# Patient Record
Sex: MALE | Race: WHITE | URBAN - METROPOLITAN AREA
[De-identification: names, ages, dates, MRNs, and addresses within clinical notes are randomized per-mention and may not be internally consistent; named-entity substitution may affect disease eponyms.]

---

## 2017-05-12 ENCOUNTER — MEDICAL CORRESPONDENCE (OUTPATIENT)
Dept: HEALTH INFORMATION MANAGEMENT | Facility: CLINIC | Age: 37
End: 2017-05-12

## 2017-05-24 ENCOUNTER — TRANSFERRED RECORDS (OUTPATIENT)
Dept: HEALTH INFORMATION MANAGEMENT | Facility: CLINIC | Age: 37
End: 2017-05-24

## 2017-06-27 DIAGNOSIS — H53.10 SUBJECTIVE VISUAL DISTURBANCE: Primary | ICD-10-CM

## 2017-06-28 ENCOUNTER — OFFICE VISIT (OUTPATIENT)
Dept: OPHTHALMOLOGY | Facility: CLINIC | Age: 37
End: 2017-06-28
Attending: OPHTHALMOLOGY
Payer: COMMERCIAL

## 2017-06-28 DIAGNOSIS — H46.9 OPTIC NEUROPATHY: ICD-10-CM

## 2017-06-28 DIAGNOSIS — H53.10 SUBJECTIVE VISUAL DISTURBANCE: ICD-10-CM

## 2017-06-28 DIAGNOSIS — H50.012 MONOCULAR ESOTROPIA OF LEFT EYE: Primary | ICD-10-CM

## 2017-06-28 PROBLEM — Z72.0 TOBACCO USER: Status: ACTIVE | Noted: 2017-06-28

## 2017-06-28 PROBLEM — E78.5 DYSLIPIDEMIA: Status: ACTIVE | Noted: 2017-06-28

## 2017-06-28 PROBLEM — F79 INTELLECTUAL FUNCTIONING DISABILITY: Status: ACTIVE | Noted: 2017-06-28

## 2017-06-28 PROBLEM — I10 HYPERTENSION: Status: ACTIVE | Noted: 2017-06-28

## 2017-06-28 PROBLEM — N28.9 RENAL INSUFFICIENCY: Status: ACTIVE | Noted: 2017-05-17

## 2017-06-28 PROCEDURE — 92015 DETERMINE REFRACTIVE STATE: CPT | Mod: ZF

## 2017-06-28 PROCEDURE — 99215 OFFICE O/P EST HI 40 MIN: CPT | Mod: 25,ZF

## 2017-06-28 PROCEDURE — 92083 EXTENDED VISUAL FIELD XM: CPT | Mod: ZF | Performed by: OPHTHALMOLOGY

## 2017-06-28 PROCEDURE — 92060 SENSORIMOTOR EXAMINATION: CPT | Mod: ZF | Performed by: OPHTHALMOLOGY

## 2017-06-28 RX ORDER — ASPIRIN 81 MG/1
TABLET ORAL
COMMUNITY
Start: 2017-05-17 | End: 2018-05-17

## 2017-06-28 RX ORDER — METOPROLOL SUCCINATE 25 MG/1
TABLET, EXTENDED RELEASE ORAL
COMMUNITY
Start: 2017-05-17 | End: 2018-05-17

## 2017-06-28 RX ORDER — LISINOPRIL AND HYDROCHLOROTHIAZIDE 20; 25 MG/1; MG/1
TABLET ORAL
COMMUNITY
Start: 2017-05-17

## 2017-06-28 RX ORDER — ACETAMINOPHEN 500 MG
TABLET ORAL
COMMUNITY
Start: 2017-05-17

## 2017-06-28 RX ORDER — EPINEPHRINE 0.3 MG/.3ML
INJECTION SUBCUTANEOUS
COMMUNITY
Start: 2017-03-15

## 2017-06-28 ASSESSMENT — CONF VISUAL FIELD
OD_NORMAL: 1
OS_NORMAL: 1

## 2017-06-28 ASSESSMENT — REFRACTION_MANIFEST
OS_AXIS: 061
OD_CYLINDER: +0.50
OS_SPHERE: +4.50
OS_CYLINDER: +2.50
OD_AXIS: 100
OD_SPHERE: +3.25

## 2017-06-28 ASSESSMENT — TONOMETRY
IOP_METHOD: TONOPEN
OS_IOP_MMHG: 21
OD_IOP_MMHG: 21

## 2017-06-28 ASSESSMENT — VISUAL ACUITY
METHOD: SNELLEN - LINEAR
OD_SC: 20/100
METHOD_MR_RETINOSCOPY: 1
OS_SC: 20/125
OD_PH_SC: 20/80

## 2017-06-28 ASSESSMENT — SLIT LAMP EXAM - LIDS
COMMENTS: NORMAL
COMMENTS: NORMAL

## 2017-06-28 ASSESSMENT — CUP TO DISC RATIO
OD_RATIO: 0.7
OS_RATIO: 0.7

## 2017-06-28 ASSESSMENT — EXTERNAL EXAM - RIGHT EYE: OD_EXAM: NORMAL

## 2017-06-28 ASSESSMENT — EXTERNAL EXAM - LEFT EYE: OS_EXAM: NORMAL

## 2017-06-28 NOTE — NURSING NOTE
Sent here at request of Dr. Leo Oh MD from [facility] in [facility]. Pt told he has Optic Atrophy. BE feeling good and comfortable. Vision is blurry, ongoing for 2 years. LAVERNE ANAYA, COA 12:41 PM 06/28/2017

## 2017-06-28 NOTE — PROGRESS NOTES
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 Congenital esotropia     Christopher Harmon is a pleasant 36 year old White male who presents to my neuro-ophthalmology clinic today referred from Dr. Oh for evaluation of optic atrophy. He has a history of amblyopia/strabismus and recalls patching one eye. He reports that his vision has progressively decreasing in both eyes in the left eye more than in the right eye over the past few years. He feels that reading has been difficult over the past a few years specially in dark conditions. Upon further questioning he states that throughout childhood he has had difficulty seeing also and was never correctable to 20/20 that he can recall.  He used to have trouble see the board from the back of the room growing up.  This gradually worsened over time. Past medical history of hypertension and mild renal insufficiency. Smokes 1 PACK PER DAY for 25 years. Social drinking less than once a week. Regular diet. No exposure to chemicals. No family history of serious ocular disease or vision problems or \"color blindness.\"    Today the visual acuity is 20/80 in the right eye and 20/125 in the left eye. No afferent pupillary defect. Color vision was abnormal bilaterally. Extraocular motility was full. Cover testing showed comitant, manifest left esotropia 16 prism diopter with suppression of the left eye under binocular conditions. Slit lamp examination was unremarkable. Fundus exam showed bilateral diffuse optic atrophy with temporal predominant excavation, otherwise normal macula, vessels and periphery in both eyes.  No retinal pigment epithelium bone spicule type changes in the retinal periphery. Cranial nerves were normal bilaterally.    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    In " "conclusion, Mr. Harmon presents today for evaluation of unexplained decreasing vision in both eyes. Examination today is notable for bilateral optic atrophy with temporal predominate exacavation, the differential diagnosis includes compressive, nutritional/toxic, and hereditary \"Dominant optic atrophy\".  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  Regardless of the ultimate etiology I informed the patient that he should stop smoking as it can worsen optic neuropathy (and likely plays a significant role if dominant optic atrophy is the final diagnosis).  We will obtain a brain and orbital MRI with and without contrast along with CBC, CMP, Vitamin B12/folate, RPR, Anti treponema, and OPA1 sequencing (in an attempt to definitely prove the diagnosis of dominant optic atrophy).    Follow-up in my clinic will depend on test results.  May refer for full field electroretinogram if first round of testing negative.     I spent a total of 60 minutes face to face with Christopher Harmon during today's office visit.  Over 50% of this time was spent counseling the patient and/or coordinating care regarding bilateral optic atrophy and vision loss of unclear etiology.    Complete documentation of historical and exam elements from today's encounter can be found in the full encounter summary report (not reduplicated in this progress note).  I personally obtained the chief complaint(s) and history of present illness.  I confirmed and edited as necessary the review of systems, past medical/surgical history, family history, social history, and examination findings as documented by others; and I examined the patient myself.  I personally reviewed the relevant tests, images, and reports as documented above.  I formulated and edited as necessary the assessment and plan and discussed the findings and management plan with the patient and family   Gary Capellan, " MD

## 2017-06-28 NOTE — LETTER
"2017    RE: Christopher Harmon  : 1980  MRN: [id number]    Dear Dr. Oh,    Thank you for referring your patient, Christopher Harmon, to my neuro-ophthalmology clinic recently.  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 Congenital esotropia     Christopher Harmon is a pleasant 36 year old White male who presents to my neuro-ophthalmology clinic today referred from Dr. Oh for evaluation of optic atrophy. He has a history of amblyopia/strabismus and recalls patching one eye. He reports that his vision has progressively decreasing in both eyes in the left eye more than in the right eye over the past few years. He feels that reading has been difficult over the past a few years specially in dark conditions. Upon further questioning he states that throughout childhood he has had difficulty seeing also and was never correctable to 20/20 that he can recall.  He used to have trouble see the board from the back of the room growing up.  This gradually worsened over time. Past medical history of hypertension and mild renal insufficiency. Smokes 1 PACK PER DAY for 25 years. Social drinking less than once a week. Regular diet. No exposure to chemicals. No family history of serious ocular disease or vision problems or \"color blindness.\"    Today the visual acuity is 20/80 in the right eye and 20/125 in the left eye. No afferent pupillary defect. Color vision was abnormal bilaterally. Extraocular motility was full. Cover testing showed comitant, manifest left esotropia 16 prism diopter with suppression of the left eye under binocular conditions. Slit lamp examination was unremarkable. Fundus exam showed bilateral diffuse optic atrophy with temporal predominant excavation, otherwise normal macula, vessels and periphery in both " "eyes.  No retinal pigment epithelium bone spicule type changes in the retinal periphery. Cranial nerves were normal bilaterally.    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    In conclusion, Mr. Harmon presents today for evaluation of unexplained decreasing vision in both eyes. Examination today is notable for bilateral optic atrophy with temporal predominate exacavation, the differential diagnosis includes compressive, nutritional/toxic, and hereditary \"Dominant optic atrophy\".  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  Regardless of the ultimate etiology I informed the patient that he should stop smoking as it can worsen optic neuropathy (and likely plays a significant role if dominant optic atrophy is the final diagnosis).  We will obtain a brain and orbital MRI with and without contrast along with CBC, CMP, Vitamin B12/folate, RPR, Anti treponema, and OPA1 sequencing (in an attempt to definitely prove the diagnosis of dominant optic atrophy).    Follow-up in my clinic will depend on test results.  May refer for full field electroretinogram if first round of testing negative.    Again, thank you for trusting me with the care of your patient.  For further exam details, please feel free to contact our office for additional records.  If you wish to contact me regarding this patient please email me at [email or web address] or give my clinic a call to arrange a phone conversation.    Sincerely,    Gary Capellan MD  , Neuro-Ophthalmology and Adult Strabismus  Department of Ophthalmology and Visual Neurosciences  [facility]    DX: bilateral optic atrophy, possible dominant optic atrophy       "

## 2017-06-28 NOTE — NURSING NOTE
Chief Complaints and History of Present Illnesses   Patient presents with     Consult For     Optic Atrophy      HPI    Additional Referring Providers:  Dr. Leo Oh MD [facility]   Affected eye(s):  Both   Symptoms:     Blurred vision (Comment: Vision is blurry, hard to see )   Decreased vision (Comment: Notes that he has to close LE to focus at near )   No double vision      Unknown duration    Frequency:  Constant          Comments:  Christopher is a 36 year old male presenting with a history of:    1. Bilateral optic atrophy  2. History of amblyopia- history of patching. Unsure which eye.   3. Strabismus     Brain MRI: none.     Referred by Dr. Oh ([place], [place]).

## 2017-06-28 NOTE — MR AVS SNAPSHOT
After Visit Summary   6/28/2017    Christopher Harmon    MRN: [id number]           Patient Information     Date Of Birth          1980        Visit Information        Provider Department      6/28/2017 12:30 PM Gary Capellan MD [facility]        Today's Diagnoses     Monocular esotropia of left eye    -  1    Subjective visual disturbance        Optic neuropathy           Follow-ups after your visit        Your next 10 appointments already scheduled     Jul 10, 2017  4:45 PM CDT   (Arrive by 4:30 PM)   MR BRAIN W/O & W CONTRAST with [address] MRI ([facility])    [address] [address] [phone]           Take your medicines as usual, unless your doctor tells you not to. Bring a list of your current medicines to your exam (including vitamins, minerals and over-the-counter drugs).  You will be given intravenous contrast for this exam. To prepare:   The day before your exam, drink extra fluids at least six 8-ounce glasses (unless your doctor tells you to restrict your fluids).   Have a blood test (creatinine test) within 30 days of your exam. Go to your clinic or Diagnostic Imaging Department for this test.  The MRI machine uses a strong magnet. Please wear clothes without metal (snaps, zippers). A sweatsuit works well, or we may give you a hospital gown.  Please remove any body piercings and hair extensions before you arrive. You will also remove watches, jewelry, hairpins, wallets, dentures, partial dental plates and hearing aids. You may wear contact lenses, and you may be able to wear your rings. We have a safe place to keep your personal items, but it is safer to leave them at home.   **IMPORTANT** THE INSTRUCTIONS BELOW ARE ONLY FOR THOSE PATIENTS WHO HAVE BEEN TOLD THEY WILL RECEIVE SEDATION OR GENERAL ANESTHESIA DURING THEIR MRI PROCEDURE:  IF YOU WILL RECEIVE SEDATION (take medicine to help you  relax during your exam):   You must get the medicine from your doctor before you arrive. Bring the medicine to the exam. Do not take it at home.   Arrive one hour early. Bring someone who can take you home after the test. Your medicine will make you sleepy. After the exam, you may not drive, take a bus or take a taxi by yourself.   No eating 8 hours before your exam. You may have clear liquids up until 4 hours before your exam. (Clear liquids include water, clear tea, black coffee and fruit juice without pulp.)  IF YOU WILL RECEIVE ANESTHESIA (be asleep for your exam):   Arrive 1 1/2 hours early. Bring someone who can take you home after the test. You may not drive, take a bus or take a taxi by yourself.   No eating 8 hours before your exam. You may have clear liquids up until 4 hours before your exam. (Clear liquids include water, clear tea, black coffee and fruit juice without pulp.)  Please call the Imaging Department at your exam site with any questions.            Jul 10, 2017  5:30 PM CDT   (Arrive by 5:15 PM)   MR ORBIT FACE NECK W/O & W CONTRAST with [address] [facility] ([facility])    [address] [address] [phone]           Take your medicines as usual, unless your doctor tells you not to. Bring a list of your current medicines to your exam (including vitamins, minerals and over-the-counter drugs).  You will be given intravenous contrast for this exam. To prepare:   The day before your exam, drink extra fluids at least six 8-ounce glasses (unless your doctor tells you to restrict your fluids).   Have a blood test (creatinine test) within 30 days of your exam. Go to your clinic or Diagnostic Imaging Department for this test.  The MRI machine uses a strong magnet. Please wear clothes without metal (snaps, zippers). A sweatsuit works well, or we may give you a hospital gown.  Please remove any body piercings and hair  extensions before you arrive. You will also remove watches, jewelry, hairpins, wallets, dentures, partial dental plates and hearing aids. You may wear contact lenses, and you may be able to wear your rings. We have a safe place to keep your personal items, but it is safer to leave them at home.   **IMPORTANT** THE INSTRUCTIONS BELOW ARE ONLY FOR THOSE PATIENTS WHO HAVE BEEN TOLD THEY WILL RECEIVE SEDATION OR GENERAL ANESTHESIA DURING THEIR MRI PROCEDURE:  IF YOU WILL RECEIVE SEDATION (take medicine to help you relax during your exam):   You must get the medicine from your doctor before you arrive. Bring the medicine to the exam. Do not take it at home.   Arrive one hour early. Bring someone who can take you home after the test. Your medicine will make you sleepy. After the exam, you may not drive, take a bus or take a taxi by yourself.   No eating 8 hours before your exam. You may have clear liquids up until 4 hours before your exam. (Clear liquids include water, clear tea, black coffee and fruit juice without pulp.)  IF YOU WILL RECEIVE ANESTHESIA (be asleep for your exam):   Arrive 1 1/2 hours early. Bring someone who can take you home after the test. You may not drive, take a bus or take a taxi by yourself.   No eating 8 hours before your exam. You may have clear liquids up until 4 hours before your exam. (Clear liquids include water, clear tea, black coffee and fruit juice without pulp.)  Please call the Imaging Department at your exam site with any questions.              Future tests that were ordered for you today     Open Future Orders        Priority Expected Expires Ordered    MR Orbit Face Neck w/o & w Contrast Routine  6/28/2018 6/28/2017    MRI Brain w & w/o contrast Routine  6/28/2018 6/28/2017    Comprehensive metabolic panel Routine  9/26/2017 6/28/2017    CBC with platelets Routine  9/26/2017 6/28/2017    Autosomal Dominant Optic Atrophy, Kjer type, [id number]: Laboratory Miscellaneous Order Routine   2017    Folate RBC Routine  2017    Hematocrit Routine  2017    RPR screen with reflex to confirm Routine  2017    Anti Treponema Routine  2017    Vitamin B12 Routine  2017            Who to contact     Please call your clinic at [phone] to:    Ask questions about your health    Make or cancel appointments    Discuss your medicines    Learn about your test results    Speak to your doctor   If you have compliments or concerns about an experience at your clinic, or if you wish to file a complaint, please contact [facility] Patient Relations at [phone] or email us at [email or web address]         Additional Information About Your Visit        [employer] Information     [employer] is an electronic gateway that provides easy, online access to your medical records. With [employer], you can request a clinic appointment, read your test results, renew a prescription or communicate with your care team.     To sign up for [employer] visit the website at [email or web address]   You will be asked to enter the access code listed below, as well as some personal information. Please follow the directions to create your username and password.     Your access code is: [email or web address]  Expires: 2017  6:30 AM     Your access code will  in 90 days. If you need help or a new code, please contact your [facility] or call [phone] for assistance.        Care EveryWhere ID     This is your Care EveryWhere ID. This could be used by other organizations to access your [place] medical records  [id number]         Blood Pressure from Last 3 Encounters:   No data found for BP    Weight from Last 3 Encounters:   No data found for Wt              We Performed the Following     Glaucoma Top OU     IOP Measurement     OCT Optic Nerve RNFL Spectralis OU (both eyes)     Refraction      Sensorimotor        Primary Care Provider    Physician No Ref-Primary       No address on file        Equal Access to Services     REDGANGA ADRIANA : Hadii aad lucía cindy Hernandez, allyson romeliaani, junior cardona chasemilady, maura brisain hayaazafar stonejyoti hiltonanna marie bailonashazafar rosario. So [facility] [phone].    ATENCIÓN: Si habla español, tiene a wolf disposición servicios gratuitos de asistencia lingüística. [facility] [phone].    We comply with applicable federal civil rights laws and [place] laws. We do not discriminate on the basis of race, color, national origin, age, disability sex, sexual orientation or gender identity.            Thank you!     Thank you for choosing EYE CLINIC  for your care. Our goal is always to provide you with excellent care. Hearing back from our patients is one way we can continue to improve our services. Please take a few minutes to complete the written survey that you may receive in the mail after your visit with us. Thank you!             Your Updated Medication List - Protect others around you: Learn how to safely use, store and throw away your medicines at www.disposemymeds.org.          This list is accurate as of: 6/28/17  3:00 PM.  Always use your most recent med list.                   Brand Name Dispense Instructions for use Diagnosis    acetaminophen 500 MG tablet    TYLENOL          aspirin EC 81 MG EC tablet           ASPIRIN PO      81 mg po qd        EPINEPHrine 0.3 MG/0.3ML injection           LISINOPRIL PO           lisinopril-hydrochlorothiazide 20-25 MG per tablet    PRINZIDE/ZESTORETIC          metoprolol 25 MG 24 hr tablet    TOPROL-XL          OMEPRAZOLE PO           UNKNOWN TO PATIENT      Not sure of name of other med for HTN

## 2017-07-10 ENCOUNTER — HOSPITAL ENCOUNTER (OUTPATIENT)
Facility: CLINIC | Age: 37
Setting detail: SPECIMEN
Discharge: HOME | End: 2017-07-10
Admitting: OPHTHALMOLOGY
Payer: COMMERCIAL

## 2017-07-10 DIAGNOSIS — H53.10 SUBJECTIVE VISUAL DISTURBANCE: ICD-10-CM

## 2017-07-10 DIAGNOSIS — H50.012 MONOCULAR ESOTROPIA OF LEFT EYE: ICD-10-CM

## 2017-07-10 DIAGNOSIS — H46.9 OPTIC NEUROPATHY: ICD-10-CM

## 2017-07-10 LAB
ALBUMIN SERPL-MCNC: 3.9 G/DL (ref 3.4–5)
ALP SERPL-CCNC: 82 U/L (ref 40–150)
ALT SERPL W P-5'-P-CCNC: 30 U/L (ref 0–70)
ANION GAP SERPL CALCULATED.3IONS-SCNC: 7 MMOL/L (ref 3–14)
AST SERPL W P-5'-P-CCNC: 19 U/L (ref 0–45)
BILIRUB SERPL-MCNC: 0.6 MG/DL (ref 0.2–1.3)
BUN SERPL-MCNC: 15 MG/DL (ref 7–30)
CALCIUM SERPL-MCNC: 9.3 MG/DL (ref 8.5–10.1)
CHLORIDE SERPL-SCNC: 105 MMOL/L (ref 94–109)
CO2 SERPL-SCNC: 25 MMOL/L (ref 20–32)
CREAT SERPL-MCNC: 1.13 MG/DL (ref 0.66–1.25)
ERYTHROCYTE [DISTWIDTH] IN BLOOD BY AUTOMATED COUNT: 13.5 % (ref 10–15)
GFR SERPL CREATININE-BSD FRML MDRD: 72 ML/MIN/1.7M2
GLUCOSE SERPL-MCNC: 93 MG/DL (ref 70–99)
HCT VFR BLD AUTO: 44.8 % (ref 40–53)
HGB BLD-MCNC: 15.1 G/DL (ref 13.3–17.7)
MCH RBC QN AUTO: 29.1 PG (ref 26.5–33)
MCHC RBC AUTO-ENTMCNC: 33.7 G/DL (ref 31.5–36.5)
MCV RBC AUTO: 86 FL (ref 78–100)
MISCELLANEOUS TEST: NORMAL
PLATELET # BLD AUTO: 237 10E9/L (ref 150–450)
POTASSIUM SERPL-SCNC: 4.1 MMOL/L (ref 3.4–5.3)
PROT SERPL-MCNC: 7.4 G/DL (ref 6.8–8.8)
RBC # BLD AUTO: 5.19 10E12/L (ref 4.4–5.9)
SODIUM SERPL-SCNC: 136 MMOL/L (ref 133–144)
VIT B12 SERPL-MCNC: 464 PG/ML (ref 193–986)
WBC # BLD AUTO: 9.5 10E9/L (ref 4–11)

## 2017-07-10 PROCEDURE — 84999 UNLISTED CHEMISTRY PROCEDURE: CPT | Performed by: OPHTHALMOLOGY

## 2017-07-10 PROCEDURE — 86780 TREPONEMA PALLIDUM: CPT | Performed by: OPHTHALMOLOGY

## 2017-07-10 PROCEDURE — 80053 COMPREHEN METABOLIC PANEL: CPT | Performed by: OPHTHALMOLOGY

## 2017-07-10 PROCEDURE — 85027 COMPLETE CBC AUTOMATED: CPT | Performed by: OPHTHALMOLOGY

## 2017-07-10 PROCEDURE — 81407 MOPATH PROCEDURE LEVEL 8: CPT | Performed by: OPHTHALMOLOGY

## 2017-07-10 PROCEDURE — 86592 SYPHILIS TEST NON-TREP QUAL: CPT | Performed by: OPHTHALMOLOGY

## 2017-07-10 PROCEDURE — 36415 COLL VENOUS BLD VENIPUNCTURE: CPT | Performed by: OPHTHALMOLOGY

## 2017-07-10 PROCEDURE — 82607 VITAMIN B-12: CPT | Performed by: OPHTHALMOLOGY

## 2017-07-10 PROCEDURE — 82747 ASSAY OF FOLIC ACID RBC: CPT | Performed by: OPHTHALMOLOGY

## 2017-07-11 LAB
RPR SER QL: NEGATIVE
T PALLIDUM IGG+IGM SER QL: NEGATIVE

## 2017-07-13 ENCOUNTER — TELEPHONE (OUTPATIENT)
Dept: OPHTHALMOLOGY | Facility: CLINIC | Age: 37
End: 2017-07-13

## 2017-07-13 LAB
FOLATE RBC-MCNC: 471 NG/ML
HCT VFR BLD CALC: 44.8 %

## 2017-08-13 LAB — LAB SCANNED RESULT: ABNORMAL

## 2017-08-14 ENCOUNTER — TELEPHONE (OUTPATIENT)
Dept: OPHTHALMOLOGY | Facility: CLINIC | Age: 37
End: 2017-08-14

## 2017-08-14 NOTE — TELEPHONE ENCOUNTER
I called the patient to discuss his diagnosis with Autosomal Dominant Optic Atrophy. I have answered all his questions.

## 2017-10-10 DIAGNOSIS — H46.9 OPTIC NEUROPATHY: Primary | ICD-10-CM

## 2024-04-12 ENCOUNTER — TELEPHONE (OUTPATIENT)
Dept: FAMILY MEDICINE | Facility: CLINIC | Age: 44
End: 2024-04-12
Payer: COMMERCIAL

## 2024-04-12 NOTE — TELEPHONE ENCOUNTER
[facility] calling to request information for Dr. Rosenberg to send Orthotics referral to. RN relayed to patient rep this is incorrect clinic and patient has not been seen since 2017. Patient rep verbalized understanding and did not have any additional questions or concerns at this time.